# Patient Record
Sex: FEMALE | HISPANIC OR LATINO | Employment: FULL TIME | URBAN - METROPOLITAN AREA
[De-identification: names, ages, dates, MRNs, and addresses within clinical notes are randomized per-mention and may not be internally consistent; named-entity substitution may affect disease eponyms.]

---

## 2022-10-05 ENCOUNTER — HOSPITAL ENCOUNTER (OUTPATIENT)
Dept: CT IMAGING | Facility: HOSPITAL | Age: 42
Discharge: HOME | End: 2022-10-05
Payer: COMMERCIAL

## 2022-10-05 ENCOUNTER — HOSPITAL ENCOUNTER (OUTPATIENT)
Dept: GENERAL RADIOLOGY | Facility: HOSPITAL | Age: 42
Discharge: HOME | End: 2022-10-05
Payer: COMMERCIAL

## 2022-10-05 ENCOUNTER — OFFICE VISIT (OUTPATIENT)
Dept: FAMILY MEDICINE | Facility: CLINIC | Age: 42
End: 2022-10-05
Payer: COMMERCIAL

## 2022-10-05 VITALS
HEART RATE: 61 BPM | SYSTOLIC BLOOD PRESSURE: 138 MMHG | HEIGHT: 66 IN | BODY MASS INDEX: 31.4 KG/M2 | TEMPERATURE: 98.3 F | OXYGEN SATURATION: 100 % | WEIGHT: 195.4 LBS | DIASTOLIC BLOOD PRESSURE: 70 MMHG

## 2022-10-05 DIAGNOSIS — M25.511 ACUTE PAIN OF RIGHT SHOULDER: ICD-10-CM

## 2022-10-05 DIAGNOSIS — R51.9 CHRONIC NONINTRACTABLE HEADACHE, UNSPECIFIED HEADACHE TYPE: Primary | ICD-10-CM

## 2022-10-05 DIAGNOSIS — M54.2 NECK PAIN: ICD-10-CM

## 2022-10-05 DIAGNOSIS — R20.0 NUMBNESS AND TINGLING OF RIGHT ARM: ICD-10-CM

## 2022-10-05 DIAGNOSIS — R29.898 RIGHT ARM WEAKNESS: ICD-10-CM

## 2022-10-05 DIAGNOSIS — R20.2 NUMBNESS AND TINGLING OF RIGHT ARM: ICD-10-CM

## 2022-10-05 DIAGNOSIS — R29.810 DROOPING OF MOUTH: ICD-10-CM

## 2022-10-05 DIAGNOSIS — G89.29 CHRONIC NONINTRACTABLE HEADACHE, UNSPECIFIED HEADACHE TYPE: Primary | ICD-10-CM

## 2022-10-05 LAB
ALBUMIN SERPL BCG-MCNC: 4.3 G/DL (ref 3.5–5.2)
ALP SERPL-CCNC: 79 U/L (ref 35–104)
ALT SERPL W P-5'-P-CCNC: 13 U/L (ref 10–35)
ANION GAP SERPL CALCULATED.3IONS-SCNC: 9 MMOL/L (ref 7–15)
AST SERPL W P-5'-P-CCNC: 25 U/L (ref 10–35)
BILIRUB SERPL-MCNC: 0.2 MG/DL
BUN SERPL-MCNC: 14.9 MG/DL (ref 6–20)
CALCIUM SERPL-MCNC: 9.2 MG/DL (ref 8.6–10)
CHLORIDE SERPL-SCNC: 102 MMOL/L (ref 98–107)
CHOLEST SERPL-MCNC: 214 MG/DL
CREAT SERPL-MCNC: 0.78 MG/DL (ref 0.51–0.95)
DEPRECATED HCO3 PLAS-SCNC: 28 MMOL/L (ref 22–29)
ERYTHROCYTE [DISTWIDTH] IN BLOOD BY AUTOMATED COUNT: 13.5 % (ref 10–15)
GFR SERPL CREATININE-BSD FRML MDRD: >90 ML/MIN/1.73M2
GLUCOSE SERPL-MCNC: 64 MG/DL (ref 70–99)
HBA1C MFR BLD: 5.9 % (ref 0–5.6)
HCT VFR BLD AUTO: 41.4 % (ref 35–47)
HDLC SERPL-MCNC: 61 MG/DL
HGB BLD-MCNC: 13.8 G/DL (ref 11.7–15.7)
LDLC SERPL CALC-MCNC: 125 MG/DL
MCH RBC QN AUTO: 26.5 PG (ref 26.5–33)
MCHC RBC AUTO-ENTMCNC: 33.3 G/DL (ref 31.5–36.5)
MCV RBC AUTO: 80 FL (ref 78–100)
NONHDLC SERPL-MCNC: 153 MG/DL
PLATELET # BLD AUTO: 349 10E3/UL (ref 150–450)
POTASSIUM SERPL-SCNC: 3.9 MMOL/L (ref 3.4–5.3)
PROT SERPL-MCNC: 7.1 G/DL (ref 6.4–8.3)
RBC # BLD AUTO: 5.2 10E6/UL (ref 3.8–5.2)
SODIUM SERPL-SCNC: 139 MMOL/L (ref 136–145)
TRIGL SERPL-MCNC: 139 MG/DL
WBC # BLD AUTO: 8.8 10E3/UL (ref 4–11)

## 2022-10-05 PROCEDURE — 85027 COMPLETE CBC AUTOMATED: CPT

## 2022-10-05 PROCEDURE — 99204 OFFICE O/P NEW MOD 45 MIN: CPT

## 2022-10-05 PROCEDURE — 83036 HEMOGLOBIN GLYCOSYLATED A1C: CPT

## 2022-10-05 PROCEDURE — 80053 COMPREHEN METABOLIC PANEL: CPT

## 2022-10-05 PROCEDURE — 36415 COLL VENOUS BLD VENIPUNCTURE: CPT

## 2022-10-05 PROCEDURE — 73030 X-RAY EXAM OF SHOULDER: CPT | Mod: RT,FY

## 2022-10-05 PROCEDURE — 70450 CT HEAD/BRAIN W/O DYE: CPT

## 2022-10-05 PROCEDURE — 80061 LIPID PANEL: CPT

## 2022-10-05 PROCEDURE — 72040 X-RAY EXAM NECK SPINE 2-3 VW: CPT | Mod: FY

## 2022-10-05 RX ORDER — ACETAMINOPHEN 500 MG
1000 TABLET ORAL EVERY 8 HOURS PRN
Qty: 30 TABLET | Refills: 0 | Status: SHIPPED | OUTPATIENT
Start: 2022-10-05

## 2022-10-05 RX ORDER — CYCLOBENZAPRINE HCL 5 MG
5 TABLET ORAL 3 TIMES DAILY PRN
Qty: 30 TABLET | Refills: 0 | Status: SHIPPED | OUTPATIENT
Start: 2022-10-05 | End: 2022-10-12

## 2022-10-05 RX ORDER — ATORVASTATIN CALCIUM 80 MG/1
80 TABLET, FILM COATED ORAL DAILY
Qty: 30 TABLET | Refills: 0 | Status: SHIPPED | OUTPATIENT
Start: 2022-10-05 | End: 2022-10-11

## 2022-10-05 ASSESSMENT — ENCOUNTER SYMPTOMS
PALPITATIONS: 0
BACK PAIN: 1
DIZZINESS: 0
SPEECH DIFFICULTY: 0
CONFUSION: 0
HEADACHES: 1
FACIAL ASYMMETRY: 1
NECK PAIN: 1
NUMBNESS: 1
PARESTHESIAS: 1
WEAKNESS: 1
SHORTNESS OF BREATH: 0

## 2022-10-05 ASSESSMENT — PAIN SCALES - GENERAL: PAINLEVEL: EXTREME PAIN (8)

## 2022-10-05 NOTE — PROGRESS NOTES
Assessment & Plan     Chronic nonintractable headache, unspecified headache type  Drooping of mouth  Numbness and tingling of right arm  Right arm weakness  Unfortunately I do have suspicion that patient had a stroke. Symptom onset was 4 weeks ago so acute intervention is not an option.  She can get in for a head CT this evening and we will see what those results are and possibly follow-up with an MRI.  I explained this to patient with  on the phone, she verbalizes understanding.  I will do blood work-up to evaluate possible cause of why she had a stroke, we will start her on Lipitor and baby aspirin.  Discussed both medications with patient she verbalized understanding.  Blood pressure today is slightly elevated.  Patient is to follow-up with me in a week and we will reassess.  We did request medical release from her prior healthcare facility but have not received those yet.   - CT Head w/o Contrast  - atorvastatin (LIPITOR) 80 MG tablet  Dispense: 30 tablet; Refill: 0  - Comprehensive metabolic panel (BMP + Alb, Alk Phos, ALT, AST, Total. Bili, TP)  - CBC with platelets  - Hemoglobin A1c  - Lipid Profile (Chol, Trig, HDL, LDL calc)  - aspirin (ASA) 81 MG EC tablet  Dispense: 30 tablet; Refill: 0    Neck pain  She reports cervical neck pain which started when the headache, arm weakness, numbness and tingling in right arm started.  She has a slightly positive Spurling's test and her pain does radiate from her neck into her shoulder down to her lower forearm.  We will start with an x-ray of the cervical spine.  Started her on Flexeril as needed for the pain, I discussed she can take this 3 times a day as needed.  I recommended she switch to Tylenol 1000 mg every 8 hours for pain as she has been taking about 12 tablets of ibuprofen a day I educated her to no longer take ibuprofen.  - CT Head w/o Contrast  - XR Cervical Spine 2/3 Views  - cyclobenzaprine (FLEXERIL) 5 MG tablet  Dispense: 30 tablet; Refill:  "0  - acetaminophen (TYLENOL) 500 MG tablet  Dispense: 30 tablet; Refill: 0    Acute pain of right shoulder  She has limited range of motion due to pain on exam of the right shoulder specifically with extension of the shoulder.  Shoulder pain could be radiating from neck, but patient reports the shoulder pain is the most significant pain, will order x-ray.  CMS is intact to the right upper extremity.   - XR Shoulder Right 2 Views      BMI:   Estimated body mass index is 31.78 kg/m  as calculated from the following:    Height as of this encounter: 1.67 m (5' 5.75\").    Weight as of this encounter: 88.6 kg (195 lb 6.4 oz).     Return in about 1 week (around 10/12/2022), or ASAP on my schedule, for Routine preventive.    Subjective   Aura is a 41 year old, presenting for the following health issues:  Headache, Back Pain, and Arm Pain   used for visit.     Patient presents today with acute onset of headache, neck pain, and arm pain.  This started abruptly 4 weeks ago.  She has had the headache neck pain and arm pain consistently since development.  It improves when she takes 4 ibuprofen.  Which she takes 2 times a day and once at night.  The headache is there when she wakes up, and does get worse with exertional activity and coughing.  She has never had a headache like this before in the past.  The arm pain comes from her neck into her shoulder down into her forearm.  She describes it as radiating in nature.  She has associated numbness and tingling in that right arm and has noted when she picks things up she drops them because of a weaker .  She has not had any recent injury.  She does not recall any event leading up to the symptoms.     Headache   Pertinent negatives include no palpitations and no shortness of breath.   Back Pain   Associated symptoms include numbness, headaches, paresthesias and weakness. Pertinent negatives include no chest pain.   Arm Pain  Associated symptoms include " "headaches, neck pain, numbness and weakness. Pertinent negatives include no chest pain.       Review of Systems   Respiratory: Negative for shortness of breath.    Cardiovascular: Negative for chest pain and palpitations.   Musculoskeletal: Positive for back pain and neck pain. Negative for gait problem.   Neurological: Positive for facial asymmetry, weakness, numbness, headaches and paresthesias. Negative for dizziness, syncope and speech difficulty.   Psychiatric/Behavioral: Negative for confusion.     Objective    /70 (BP Location: Right arm, Patient Position: Sitting, Cuff Size: Adult Large)   Pulse 61   Temp 98.3  F (36.8  C) (Oral)   Ht 1.67 m (5' 5.75\")   Wt 88.6 kg (195 lb 6.4 oz)   LMP 10/02/2022 (Exact Date)   SpO2 100%   BMI 31.78 kg/m    Body mass index is 31.78 kg/m .     Physical Exam  Constitutional:       Appearance: She is obese.   HENT:      Mouth/Throat:      Mouth: Mucous membranes are moist.      Pharynx: Oropharynx is clear.   Eyes:      Extraocular Movements: Extraocular movements intact.      Pupils: Pupils are equal, round, and reactive to light.   Neck:      Comments: Slightly positive Spurling's test with shock feeling in her teeth and into her shoulder  Cardiovascular:      Rate and Rhythm: Normal rate and regular rhythm.      Pulses: Normal pulses.      Heart sounds: Normal heart sounds.   Pulmonary:      Effort: Pulmonary effort is normal.      Breath sounds: Normal breath sounds.   Musculoskeletal:      Right shoulder: Tenderness and bony tenderness present. No swelling, deformity, effusion or crepitus. Decreased range of motion. Normal strength. Normal pulse.      Left shoulder: Normal.      Right upper arm: No tenderness or bony tenderness.      Left upper arm: Normal.      Right elbow: Normal range of motion. No tenderness.      Left elbow: Normal.      Right hand: No tenderness. Decreased strength. Decreased sensation. Normal capillary refill. Normal pulse.      " Cervical back: Tenderness present. No rigidity.      Comments: Right hand  is decreased compared to the left. Sensation is decreased compared to the left per patient when tested simultaneously.    Lymphadenopathy:      Cervical: No cervical adenopathy.   Skin:     General: Skin is warm and dry.   Neurological:      Mental Status: She is alert.      Cranial Nerves: Facial asymmetry present.      Sensory: No sensory deficit.      Motor: Weakness present. No pronator drift.      Coordination: Coordination normal. Finger-Nose-Finger Test and Heel to Shin Test normal. Rapid alternating movements normal.      Gait: Gait normal.      Deep Tendon Reflexes:      Reflex Scores:       Patellar reflexes are 2+ on the right side and 2+ on the left side.       Achilles reflexes are 2+ on the right side and 2+ on the left side.     Comments: CN 2-12 intact with the exception of CN 5 and 7, mouth droop to the left side with twitching of the lip and cheek. All other facial movements are symmetric and equal. When tested simultaneously, patient has less sensation in ophthalmic, maxillary, and mandibular regions on the left side compared to the right.   Weakness to the right lower extremity during hand .      The 10-year ASCVD risk score ([place] DC Jr., et al., 2013) is: 0.6%    Values used to calculate the score:      Age: 41 years      Sex: Female      Is Non- : No      Diabetic: No      Tobacco smoker: No      Systolic Blood Pressure: 138 mmHg      Is BP treated: No      HDL Cholesterol: 61 mg/dL      Total Cholesterol: 214 mg/dL    Lab Results   Component Value Date    CHOL 214 10/05/2022     Lab Results   Component Value Date    HDL 61 10/05/2022     Lab Results   Component Value Date     10/05/2022     Lab Results   Component Value Date    TRIG 139 10/05/2022     At the end of the visit, I confirmed understanding of what was discussed. Patient has not further questions or concerns that were  brought up at this time.     Jean Carlos Stiles, KATHERINE, APRN, FNP-C

## 2022-10-06 DIAGNOSIS — R29.810 DROOPING OF MOUTH: Primary | ICD-10-CM

## 2022-10-06 DIAGNOSIS — R20.0 NUMBNESS AND TINGLING OF RIGHT ARM: ICD-10-CM

## 2022-10-06 DIAGNOSIS — R20.2 NUMBNESS AND TINGLING OF RIGHT ARM: ICD-10-CM

## 2022-10-06 DIAGNOSIS — R29.898 RIGHT ARM WEAKNESS: ICD-10-CM

## 2022-10-06 NOTE — PROGRESS NOTES
Patient's CT scan was without abnormal finding, will place order for MRI to rule out stroke as patient has right arm weakness, headaches, and drooping of her mouth for 4 weeks.

## 2022-10-07 ENCOUNTER — TELEPHONE (OUTPATIENT)
Dept: FAMILY MEDICINE | Facility: CLINIC | Age: 42
End: 2022-10-07

## 2022-10-10 ENCOUNTER — TELEPHONE (OUTPATIENT)
Dept: FAMILY MEDICINE | Facility: CLINIC | Age: 42
End: 2022-10-10

## 2022-10-10 DIAGNOSIS — R29.810 DROOPING OF MOUTH: ICD-10-CM

## 2022-10-10 NOTE — TELEPHONE ENCOUNTER
"Patient calling back in regards to VMs left below.  No message from provider to relay at this time.  Writer checked lab results, no notes there to relay either.  Informed patient of this, to which she relied \"that is okay, I will see her on Wednesday and get the results at that time.\"  Call was ended.  "

## 2022-10-10 NOTE — TELEPHONE ENCOUNTER
FAX [facility] 90 Day Prescription Request    Message:  Patient is requesting authorization to dispense a 90 day supply    Drug: ATORVASTATIN 80 MG Tablets

## 2022-10-11 ENCOUNTER — TELEPHONE (OUTPATIENT)
Dept: FAMILY MEDICINE | Facility: CLINIC | Age: 42
End: 2022-10-11

## 2022-10-11 RX ORDER — ATORVASTATIN CALCIUM 80 MG/1
80 TABLET, FILM COATED ORAL DAILY
Qty: 90 TABLET | Refills: 0 | Status: SHIPPED | OUTPATIENT
Start: 2022-10-11 | End: 2022-11-10

## 2022-10-12 ENCOUNTER — OFFICE VISIT (OUTPATIENT)
Dept: FAMILY MEDICINE | Facility: CLINIC | Age: 42
End: 2022-10-12
Payer: COMMERCIAL

## 2022-10-12 VITALS
BODY MASS INDEX: 32.29 KG/M2 | WEIGHT: 193.8 LBS | DIASTOLIC BLOOD PRESSURE: 74 MMHG | HEART RATE: 68 BPM | HEIGHT: 65 IN | SYSTOLIC BLOOD PRESSURE: 128 MMHG

## 2022-10-12 DIAGNOSIS — R29.898 RIGHT ARM WEAKNESS: ICD-10-CM

## 2022-10-12 DIAGNOSIS — R20.0 NUMBNESS AND TINGLING OF RIGHT ARM: ICD-10-CM

## 2022-10-12 DIAGNOSIS — R29.810 DROOPING OF MOUTH: Primary | ICD-10-CM

## 2022-10-12 DIAGNOSIS — R73.03 PREDIABETES: ICD-10-CM

## 2022-10-12 DIAGNOSIS — R20.2 NUMBNESS AND TINGLING OF RIGHT ARM: ICD-10-CM

## 2022-10-12 LAB
TSH SERPL DL<=0.005 MIU/L-ACNC: 1.7 UIU/ML (ref 0.3–4.2)
VIT B12 SERPL-MCNC: 711 PG/ML (ref 232–1245)

## 2022-10-12 PROCEDURE — 84443 ASSAY THYROID STIM HORMONE: CPT

## 2022-10-12 PROCEDURE — 36415 COLL VENOUS BLD VENIPUNCTURE: CPT

## 2022-10-12 PROCEDURE — 99214 OFFICE O/P EST MOD 30 MIN: CPT

## 2022-10-12 PROCEDURE — 82607 VITAMIN B-12: CPT

## 2022-10-12 PROCEDURE — 86618 LYME DISEASE ANTIBODY: CPT

## 2022-10-12 RX ORDER — METFORMIN HCL 500 MG
1000 TABLET, EXTENDED RELEASE 24 HR ORAL 2 TIMES DAILY WITH MEALS
Qty: 90 TABLET | Refills: 3 | Status: SHIPPED | OUTPATIENT
Start: 2022-10-12

## 2022-10-12 RX ORDER — PREDNISONE 20 MG/1
40 TABLET ORAL DAILY
Qty: 10 TABLET | Refills: 0 | Status: SHIPPED | OUTPATIENT
Start: 2022-10-12 | End: 2022-10-17

## 2022-10-12 ASSESSMENT — ENCOUNTER SYMPTOMS
EYE PAIN: 0
SPEECH DIFFICULTY: 0
FEVER: 0
HEADACHES: 1
SHORTNESS OF BREATH: 0
NUMBNESS: 1
NECK STIFFNESS: 1
WEAKNESS: 1
BACK PAIN: 0
FACIAL ASYMMETRY: 1
NECK PAIN: 1
CHEST TIGHTNESS: 0
PHOTOPHOBIA: 0
PARESTHESIAS: 1
DIAPHORESIS: 0
DIZZINESS: 1
FATIGUE: 0
PALPITATIONS: 0
SEIZURES: 0

## 2022-10-12 NOTE — PROGRESS NOTES
Assessment & Plan     Drooping of mouth  Numbness and tingling of right arm  Right arm weakness  On exam her handgrips are equal and arm strength are equal.  She is still reporting pain but improvement when she uses Flexeril.  I explained that I do think physical therapy would be beneficial and ordered this.  I did encourage patient to schedule the MRI I ordered to see if we can find any cause to the symptoms.  I added on an MRA of the [place] to the order as well. The drooping of her mouth does seem to be progressing with no other neuro changes. The droop is more prominent with smiling than on previous exam. I will do a 5 day course of prednisone to see if this provides benefit. On exam there is no swelling of the left eye today. Unsure the etiology of this, but patient reports it was not painful, and there was no vision changes with the swelling.  We will wait and see what the MRI/MRA and blood work results look like.   - TSH with free T4 reflex  - Lyme Disease Total Abs Bld with Reflex to Confirm CLIA  - Vitamin B12  - Physical Therapy Referral  - ESR  - CRP    Prediabetes  Patient's A1c is 5.9.  She notes every person in her family has diabetes except for her. I discussed that if she wanted we could start metformin.  I explained side effects and dosing instructions.  Her BMI is 32.  Based on this, her family history, and her reporting not a lot of exercise in her daily life, I do think treating the prediabetes would benefit her in the long-run. She reports she would rather start the medication now and try and prevent type II diabetes from developing.  We discussed signs and symptoms of both hypoglycemia and hyperglycemia. I will see her again in three months to see how things are going.   - metFORMIN (GLUCOPHAGE XR) 500 MG 24 hr tablet; Take 2 tablets (1,000 mg) by mouth 2 times daily (with meals) Week 1: Take 1 tablet with breakfast for a week. Week 2: Take 1 tablet with breakfast and 1 tablet with  "dinner for a week Week 3: Take 2 tablets with breakfast and 1 tablet with dinner for a week Week 4: Take 2 tablets with breakfast and 2 tablets with dinner for a week. Continue at this dose.  Dispense: 90 tablet; Refill: 3      Return in about 3 months (around 1/12/2023) for Follow up. To see how she is doing after starting metformin    Jai Stiles, APRN CNP  [facility]    Subjective   Aura is a 41 year old, presenting for the following health issues:  Follow up after visit for arm pain, weakness, and facial droop.     HPI     Her right arm is still hurting form her right shoulder down into her hand. Her hand is numb and tingling still and she feels like it is still weak. The flexeril has been helping with the pain. She still notices the left side of her face drooping. She also had right sided eye swelling yesterday.  She has not noticed any other neurological changes since her last visit.     Review of Systems   Constitutional: Negative for diaphoresis, fatigue and fever.   Eyes: Negative for photophobia, pain and visual disturbance.        Swelling to the left eye yesterday   Respiratory: Negative for chest tightness and shortness of breath.    Cardiovascular: Negative for chest pain, palpitations and peripheral edema.   Musculoskeletal: Positive for neck pain and neck stiffness. Negative for back pain and gait problem.   Neurological: Positive for dizziness, facial asymmetry, weakness, numbness, headaches and paresthesias. Negative for seizures, syncope and speech difficulty.         Objective    /74 (BP Location: Right arm, Patient Position: Sitting, Cuff Size: Adult Regular)   Pulse 68   Ht 1.651 m (5' 5\")   Wt 87.9 kg (193 lb 12.8 oz)   LMP 10/02/2022 (Exact Date)   BMI 32.25 kg/m    Body mass index is 32.25 kg/m .  Physical Exam  HENT:      Right Ear: Tympanic membrane, ear canal and external ear normal.      Left Ear: Tympanic membrane, ear canal and external ear " normal.   Eyes:      Extraocular Movements: Extraocular movements intact.      Pupils: Pupils are equal, round, and reactive to light.   Cardiovascular:      Rate and Rhythm: Normal rate and regular rhythm.      Pulses: Normal pulses.      Heart sounds: Normal heart sounds.   Pulmonary:      Effort: Pulmonary effort is normal.      Breath sounds: Normal breath sounds. No wheezing.   Musculoskeletal:      Cervical back: Normal range of motion. No rigidity.   Lymphadenopathy:      Cervical: No cervical adenopathy.   Neurological:      Mental Status: She is alert.      Cranial Nerves: Facial asymmetry present.      Motor: No weakness, tremor, abnormal muscle tone or pronator drift.      Coordination: Romberg sign negative.      Gait: Gait normal.      Comments: Drooping of mouth with smile, drooping to the left. Appears to be increase in the amount of drooping when compared to last week.         At the end of the visit, I confirmed understanding of what was discussed. Patient has not further questions or concerns that were brought up at this time.     Jean Carlos Stiles, KATHERINE, APRN, FNP-C

## 2022-10-13 LAB — B BURGDOR IGG+IGM SER QL: 0.51

## 2022-10-14 ENCOUNTER — TELEPHONE (OUTPATIENT)
Dept: FAMILY MEDICINE | Facility: CLINIC | Age: 42
End: 2022-10-14

## 2022-10-14 NOTE — TELEPHONE ENCOUNTER
Called patient via  services and left a generic message for her to return phone call. If patient returns call please inform her of the results/message below.

## 2022-10-14 NOTE — TELEPHONE ENCOUNTER
----- Message from ZANDRA Alicea CNP sent at 10/13/2022 10:34 AM CDT -----  Please call patient with :    Lyme disease test was negative, thyroid is functioning normally, and her B12 was normal on blood work yesterday. She will need to have the MRI imaging completed for us to try and determine if symptoms are from a stroke or not. Continue with the management plan we talked about yesterday, and I will watch for the MRI results and be in contact with her.     Jean Carlos Stiles

## 2022-11-02 ENCOUNTER — HOSPITAL ENCOUNTER (OUTPATIENT)
Dept: MRI IMAGING | Facility: HOSPITAL | Age: 42
Discharge: HOME | End: 2022-11-02
Payer: COMMERCIAL

## 2022-11-02 DIAGNOSIS — R20.0 NUMBNESS AND TINGLING OF RIGHT ARM: ICD-10-CM

## 2022-11-02 DIAGNOSIS — R20.2 NUMBNESS AND TINGLING OF RIGHT ARM: ICD-10-CM

## 2022-11-02 DIAGNOSIS — R29.898 RIGHT ARM WEAKNESS: ICD-10-CM

## 2022-11-02 DIAGNOSIS — R29.810 DROOPING OF MOUTH: ICD-10-CM

## 2022-11-02 PROCEDURE — 70544 MR ANGIOGRAPHY HEAD W/O DYE: CPT

## 2022-11-02 PROCEDURE — 70551 MRI BRAIN STEM W/O DYE: CPT

## 2022-11-03 ENCOUNTER — LAB (OUTPATIENT)
Dept: LAB | Facility: CLINIC | Age: 42
End: 2022-11-03
Payer: COMMERCIAL

## 2022-11-03 DIAGNOSIS — R29.810 DROOPING OF MOUTH: ICD-10-CM

## 2022-11-03 LAB
CRP SERPL-MCNC: <3 MG/L
ERYTHROCYTE [SEDIMENTATION RATE] IN BLOOD BY WESTERGREN METHOD: 11 MM/HR (ref 0–20)

## 2022-11-03 PROCEDURE — 86140 C-REACTIVE PROTEIN: CPT

## 2022-11-03 PROCEDURE — 36415 COLL VENOUS BLD VENIPUNCTURE: CPT

## 2022-11-03 PROCEDURE — 85652 RBC SED RATE AUTOMATED: CPT

## 2022-11-04 ENCOUNTER — TELEPHONE (OUTPATIENT)
Dept: FAMILY MEDICINE | Facility: CLINIC | Age: 42
End: 2022-11-04

## 2022-11-04 DIAGNOSIS — R20.0 NUMBNESS AND TINGLING OF RIGHT ARM: ICD-10-CM

## 2022-11-04 DIAGNOSIS — R93.89 ABNORMAL MRI: Primary | ICD-10-CM

## 2022-11-04 DIAGNOSIS — R29.810 DROOPING OF MOUTH: ICD-10-CM

## 2022-11-04 DIAGNOSIS — R29.898 RIGHT ARM WEAKNESS: ICD-10-CM

## 2022-11-04 DIAGNOSIS — R20.2 NUMBNESS AND TINGLING OF RIGHT ARM: ICD-10-CM

## 2022-11-04 NOTE — TELEPHONE ENCOUNTER
I called patient with  to go over imaging results. I explained that while the MRA did not explain her symptoms, but the MRI showed hyperintense signal abnormalities that could be due to age or due to a demyelination disease like multiple sclerosis. I did talk about how since she is having the onset of facial droop, arm weakness, and arm tingling/numbnes I think seeing a neurologist would be valuable for further work-up. She agrees. Referral was placed.

## 2022-11-08 DIAGNOSIS — R29.810 DROOPING OF MOUTH: ICD-10-CM

## 2022-11-10 RX ORDER — ATORVASTATIN CALCIUM 80 MG/1
80 TABLET, FILM COATED ORAL DAILY
Qty: 90 TABLET | Refills: 3 | Status: SHIPPED | OUTPATIENT
Start: 2022-11-10

## 2022-11-10 NOTE — TELEPHONE ENCOUNTER
"Routing refill request to provider for review/approval because:  Early refill requested.    Last Written Prescription Date:  10/11/22  Last Fill Quantity: 90,  # refills: 0   Last office visit provider:  10/12/22     Requested Prescriptions   Pending Prescriptions Disp Refills     atorvastatin (LIPITOR) 80 MG tablet 90 tablet 0     Sig: Take 1 tablet (80 mg) by mouth daily       Statins Protocol Passed - 11/10/2022 12:54 PM        Passed - LDL on file in past 12 months     Recent Labs   Lab Test 10/05/22  1625   *             Passed - No abnormal creatine kinase in past 12 months     No lab results found.             Passed - Recent (12 mo) or future (30 days) visit within the authorizing provider's specialty     Patient has had an office visit with the authorizing provider or a provider within the authorizing providers department within the previous 12 mos or has a future within next 30 days. See \"Patient Info\" tab in inbasket, or \"Choose Columns\" in Meds & Orders section of the refill encounter.              Passed - Medication is active on med list        Passed - Patient is age 18 or older        Passed - No active pregnancy on record        Passed - No positive pregnancy test in past 12 months             Jimmy Noriega RN 11/10/22 12:54 PM  "

## 2022-11-14 ENCOUNTER — OFFICE VISIT (OUTPATIENT)
Dept: NEUROLOGY | Facility: CLINIC | Age: 42
End: 2022-11-14
Payer: COMMERCIAL

## 2022-11-14 VITALS — HEART RATE: 77 BPM | DIASTOLIC BLOOD PRESSURE: 96 MMHG | SYSTOLIC BLOOD PRESSURE: 142 MMHG

## 2022-11-14 DIAGNOSIS — R29.898 RIGHT LEG WEAKNESS: Primary | ICD-10-CM

## 2022-11-14 DIAGNOSIS — R29.898 RIGHT ARM WEAKNESS: ICD-10-CM

## 2022-11-14 DIAGNOSIS — R20.0 NUMBNESS AND TINGLING OF RIGHT ARM: ICD-10-CM

## 2022-11-14 DIAGNOSIS — G43.509 PERSISTENT MIGRAINE AURA WITHOUT CEREBRAL INFARCTION AND WITHOUT STATUS MIGRAINOSUS, NOT INTRACTABLE: ICD-10-CM

## 2022-11-14 DIAGNOSIS — R20.2 NUMBNESS AND TINGLING OF RIGHT ARM: ICD-10-CM

## 2022-11-14 DIAGNOSIS — M50.20 CERVICAL HERNIATED DISC: ICD-10-CM

## 2022-11-14 PROCEDURE — 99204 OFFICE O/P NEW MOD 45 MIN: CPT | Performed by: PSYCHIATRY & NEUROLOGY

## 2022-11-14 NOTE — PATIENT INSTRUCTIONS
We found some weakness on your right side.    We are concerned that there is a problem in your neck. This may have bene caused by the injury you had. This could have caused a disk in the neck to shift and could be irritating the nerves causing your headache. MRI will help us know for sure.     We would also recommend physical therapy for the neck.     Follow up 8-10 weeks

## 2022-11-14 NOTE — PROGRESS NOTES
Neurology Clinic Visit    Reason: Right arm tingling and facial droop       11/14/2022   Source of information: Patient and chart review    History of Present Symptom:  Nataliia Chin is a 41 year old female with a PMH significant for prediabetes who presents for evaluation of right arm tingling and facial droop after MRI showed some T2 white matter changes.      She reported she had a unilateral headache and pain in right side. She gets tingling in the right hand associated with headaches. She has dropped things like cups of coffee with her hand. She also feels her right ankle becomes weak. Sometimes when she gets headache she needs to lay down because she may fall. Her pain gets worse when she flexes her neck, and she does sometimes get a sensation down her spine. These first started after she hit her head on a metal jethro at work one year ago.     Headaches are happening about once or twice a day. She started ibuprofen after a visit with primary care and that has been helpful. They are up to 9/10 at the worst and they resolved with medication. She is taking ibuprofen 600 about 2-3 times per week. Worse with birght light or loud sounds.      No prior history of vision loss in one eye, double vision, or balance difficulty. No previous episodes of weakness or numbness that resolved. No bowel or bladder changes.     The patient's medical, surgical, social, and family history were personally reviewed with the patient.  No past medical history on file.   Past Surgical History:   Procedure Laterality Date     LAPAROSCOPIC TUBAL LIGATION       Social History     Tobacco Use     Smoking status: Never     Smokeless tobacco: Never     No family history on file.  Current Outpatient Medications   Medication     acetaminophen (TYLENOL) 500 MG tablet     aspirin (ASA) 81 MG EC tablet     atorvastatin (LIPITOR) 80 MG tablet     cyclobenzaprine (FLEXERIL) 10 MG tablet     metFORMIN (GLUCOPHAGE XR) 500 MG 24 hr tablet      No current facility-administered medications for this visit.     Allergies   Allergen Reactions     Penicillins Anaphylaxis and Itching     At age 15 years old--injection of penicillin     Amoxicillin Itching         Review of Systems:  14-point review of systems was completed. The pertinent positives and negatives are in the HPI.    Physical Examination   Vitals: BP (!) 142/96 (BP Location: Right arm, Patient Position: Sitting, Cuff Size: Adult Regular)   Pulse 77   LMP 10/02/2022 (Exact Date)   General: Patient appears comfortable in no acute distress.   HEENT: NC/AT, no icterus, moist mucous membranes  Chest: non-labored on RA  Extremities: Warm, no edema  Skin: No rash or lesion   Psych: Affect appropriate for situation   Neuro:  Mental status: Awake, alert, attentive. Language is fluent with intact comprehension of commands.  Cranial nerves: PERRL with no relative afferent pupillary defect, conjugate gaze, EOMI, visual fields intact, face symmetric, shoulder shrug strong, tongue protrusion/uvula midline, no dysarthria.   Motor: Normal muscle bulk and tone. No abnormal movements. 5/5 strength in 4/4 extremities.     R L  Deltoid  5 5  Biceps  5 5  Triceps 5 5  Wrist ext 5- 5  Finger abd 5- 5-    Hip flexion 4 5  Knee flexion 5 5  Knee ext 5 5  Dorsiflexion 4 5    Reflexes: brisk reflexes symmetric in biceps, brachioradialis, 3+ bilateral patellae with crossed adduction, and brisk achilles on the right. No clonus.  Sensory: Intact to light touch, pin, vibration, and proprioception. Romberg is negative.   Coordination: FNF without ataxia or dysmetria.    Gait: Normal width, stride length, turn, with symmetric arm swing. Tandem walk intact.    Laboratory:  B12 711  Lyme 0.51   CRP neg   A1C 5.9     Imaging:  MRI brain 11/2/22 a few very nonspecific white matter lesions   MRA IMPRESSION:  1.  Normal variant anatomy at the level [place]. No evidence for aneurysm or large vessel occlusion. Tortuosity  cavernous ICA segment on the left. See above for details and description. Symmetric peripheral branch arborization UVALDO, MCA and PCA   vascular territory.    Assessment/Plan:  Nataliia Chin is a 41 year old female who presents for evaluation of right sided headaches and weakness. Her exam is with right sided weakness in the hand and leg with some limitation due to pain and brisk reflexes with cross at the adductors. Her symptoms started after head injury. Her exam and symptoms are consistent with a cervical cord lesion, it is possible that at this injury she herniated a disc causing some compression in the cord. The timing makes us suspicious for this. There are other possible causes as well.     -MRI c-spine   -physcial therapy for the neck, this should help with headaches too  -follow up 8-10 weeks     Patient seen and discussed with Dr. Soto.   I have reviewed the plan with the patient, who is in agreement.      Krista Fernandez DO  Multiple Sclerosis Fellow

## 2022-11-14 NOTE — LETTER
11/14/2022         RE: Nataliia Chin  [address] [place] [address]        Dear Colleague,    Thank you for referring your patient, Nataliia Chin, to the [facility]. Please see a copy of my visit note below.      Neurology Clinic Visit    Reason: Right arm tingling and facial droop       11/14/2022   Source of information: Patient and chart review    History of Present Symptom:  Nataliia Chin is a 41 year old female with a PMH significant for prediabetes who presents for evaluation of right arm tingling and facial droop after MRI showed some T2 white matter changes.      She reported she had a unilateral headache and pain in right side. She gets tingling in the right hand associated with headaches. She has dropped things like cups of coffee with her hand. She also feels her right ankle becomes weak. Sometimes when she gets headache she needs to lay down because she may fall. Her pain gets worse when she flexes her neck, and she does sometimes get a sensation down her spine. These first started after she hit her head on a metal jethro at work one year ago.     Headaches are happening about once or twice a day. She started ibuprofen after a visit with primary care and that has been helpful. They are up to 9/10 at the worst and they resolved with medication. She is taking ibuprofen 600 about 2-3 times per week. Worse with birght light or loud sounds.      No prior history of vision loss in one eye, double vision, or balance difficulty. No previous episodes of weakness or numbness that resolved. No bowel or bladder changes.     The patient's medical, surgical, social, and family history were personally reviewed with the patient.  No past medical history on file.   Past Surgical History:   Procedure Laterality Date     LAPAROSCOPIC TUBAL LIGATION       Social History     Tobacco Use     Smoking status: Never     Smokeless tobacco: Never     No family  history on file.  Current Outpatient Medications   Medication     acetaminophen (TYLENOL) 500 MG tablet     aspirin (ASA) 81 MG EC tablet     atorvastatin (LIPITOR) 80 MG tablet     cyclobenzaprine (FLEXERIL) 10 MG tablet     metFORMIN (GLUCOPHAGE XR) 500 MG 24 hr tablet     No current facility-administered medications for this visit.     Allergies   Allergen Reactions     Penicillins Anaphylaxis and Itching     At age 15 years old--injection of penicillin     Amoxicillin Itching         Review of Systems:  14-point review of systems was completed. The pertinent positives and negatives are in the HPI.    Physical Examination   Vitals: BP (!) 142/96 (BP Location: Right arm, Patient Position: Sitting, Cuff Size: Adult Regular)   Pulse 77   LMP 10/02/2022 (Exact Date)   General: Patient appears comfortable in no acute distress.   HEENT: NC/AT, no icterus, moist mucous membranes  Chest: non-labored on RA  Extremities: Warm, no edema  Skin: No rash or lesion   Psych: Affect appropriate for situation   Neuro:  Mental status: Awake, alert, attentive. Language is fluent with intact comprehension of commands.  Cranial nerves: PERRL with no relative afferent pupillary defect, conjugate gaze, EOMI, visual fields intact, face symmetric, shoulder shrug strong, tongue protrusion/uvula midline, no dysarthria.   Motor: Normal muscle bulk and tone. No abnormal movements. 5/5 strength in 4/4 extremities.     R L  Deltoid  5 5  Biceps  5 5  Triceps 5 5  Wrist ext 5- 5  Finger abd 5- 5-    Hip flexion 4 5  Knee flexion 5 5  Knee ext 5 5  Dorsiflexion 4 5    Reflexes: brisk reflexes symmetric in biceps, brachioradialis, 3+ bilateral patellae with crossed adduction, and brisk achilles on the right. No clonus.  Sensory: Intact to light touch, pin, vibration, and proprioception. Romberg is negative.   Coordination: FNF without ataxia or dysmetria.    Gait: Normal width, stride length, turn, with symmetric arm swing. Tandem walk  intact.    Laboratory:  B12 711  [place] 0.51   CRP neg   A1C 5.9     Imaging:  MRI brain 11/2/22 a few very nonspecific white matter lesions   MRA IMPRESSION:  1.  Normal variant anatomy at the level [place] of [place]. No evidence for aneurysm or large vessel occlusion. Tortuosity cavernous ICA segment on the left. See above for details and description. Symmetric peripheral branch arborization UVALDO, MCA and PCA   vascular territory.    Assessment/Plan:  Nataliia Chin is a 41 year old female who presents for evaluation of right sided headaches and weakness. Her exam is with right sided weakness in the hand and leg with some limitation due to pain and brisk reflexes with cross at the adductors. Her symptoms started after head injury. Her exam and symptoms are consistent with a cervical cord lesion, it is possible that at this injury she herniated a disc causing some compression in the cord. The timing makes us suspicious for this. There are other possible causes as well.     -MRI c-spine   -physcial therapy for the neck, this should help with headaches too  -follow up 8-10 weeks     Patient seen and discussed with Dr. Soto.   I have reviewed the plan with the patient, who is in agreement.      Krista Fernandez DO  Multiple Sclerosis Fellow             Attestation signed by Deb Soto MD at 11/19/2022  8:50 AM:  I personally saw and evaluated Ms. Chacho Chin with Dr. Fernandez on the date of service.  I have reviewed the above documentation and agree with the findings and recommendations.     Persistent right sided weakness and pain. Endorses radicular symptoms in right c6 dist. Recommend imaging of cervical spine     Demyelinating disease not suspected based on MRI brain.     Advised physical therapy     Patient to follow up in 8-10 weeks     Entire visit was completed with the assistance of a professional medical  who was present through the ipad.     A total of 50 minutes were  personally spent in the care of this patient on the date of service.     Deb Soto MD on 11/19/2022 at 8:47 AM        Again, thank you for allowing me to participate in the care of your patient.        Sincerely,        Deb Soto MD

## 2022-11-17 ENCOUNTER — HOSPITAL ENCOUNTER (OUTPATIENT)
Dept: PHYSICAL THERAPY | Facility: REHABILITATION | Age: 42
Discharge: HOME | End: 2022-11-17
Attending: COUNSELOR
Payer: COMMERCIAL

## 2022-11-17 DIAGNOSIS — M50.20 CERVICAL HERNIATED DISC: ICD-10-CM

## 2022-11-17 DIAGNOSIS — R20.2 NUMBNESS AND TINGLING OF RIGHT ARM: ICD-10-CM

## 2022-11-17 DIAGNOSIS — R20.0 NUMBNESS AND TINGLING OF RIGHT ARM: ICD-10-CM

## 2022-11-17 DIAGNOSIS — R29.898 RIGHT ARM WEAKNESS: ICD-10-CM

## 2022-11-17 PROCEDURE — 97140 MANUAL THERAPY 1/> REGIONS: CPT | Mod: GP | Performed by: PHYSICAL THERAPIST

## 2022-11-17 PROCEDURE — 97110 THERAPEUTIC EXERCISES: CPT | Mod: GP | Performed by: PHYSICAL THERAPIST

## 2022-11-17 PROCEDURE — 97162 PT EVAL MOD COMPLEX 30 MIN: CPT | Mod: GP | Performed by: PHYSICAL THERAPIST

## 2022-11-17 NOTE — PROGRESS NOTES
Subjective: She has been getting headaches and neck pain for around one year without any significant relief of her symptoms. This started after she hit her head on a metal jethro at work. She notes some decrease strength in her hand, causing her to drop coffee cups and other things.    Assessment: Nataliia presents to therapy today with c/o three month history of cervical pain with radiculopathy. She reported complete centralization of her symptoms with manual therapy today, noting the most relief with side glides. Her pain did come back when she stood up, but only to her shoulder. Her quick response to manual techniques indicates likely functional instability in her cervical spine which may lead to recurrence of her symptoms. She was educated on this and expressed her understanding. Nataliia will benefit from skilled therapy services to promote centralization and abolition of her symptoms and increase her cervical postural strength.    Therapeutic Exercise    Date 11/17/22   Exercise    Supine chin tuck x10   Seated scap squeezes x10

## 2022-11-17 NOTE — PROGRESS NOTES
11/17/22 1600   General Information   Type of Visit Initial OP Ortho PT Evaluation   Start of Care Date 11/17/22   Referring Physician Krista Fernandez MD   Orders Evaluate and Treat   Date of Order 11/14/22   Certification Required? No   Medical Diagnosis Cervical herniated disc   Surgical/Medical history reviewed Yes   Precautions/Limitations no known precautions/limitations       Present Yes   Language Lithuanian   Body Part(s)   Body Part(s) Cervical Spine   Presentation and Etiology   Pertinent history of current problem (include personal factors and/or comorbidities that impact the POC) See note   Impairments A. Pain;D. Decreased ROM;E. Decreased flexibility;K. Numbness;L. Tingling   Functional Limitations perform activities of daily living;perform desired leisure / sports activities;perform required work activities   Symptom Location Right side of neck, right arm all the way down to hand.   How/Where did it occur At work   Onset date of current episode/exacerbation 08/14/22   Chronicity Chronic   Pain rating (0-10 point scale) Best (/10);Worst (/10)   Best (/10) 8   Worst (/10) 10   Pain quality C. Aching;D. Burning   Frequency of pain/symptoms A. Constant   Pain/symptoms are: Worse during the day   Pain/symptoms exacerbated by I. Bending;J. ADL;K. Home tasks;L. Work tasks;D. Carrying;C. Lifting;G. Certain positions;H. Overhead reach   Pain/symptoms eased by I. OTC medication(s)   Progression of symptoms since onset: Worsened   Fall Risk Screen   Fall screen completed by PT   Have you fallen 2 or more times in the past year? No   Have you fallen and had an injury in the past year? No   Is patient a fall risk? No   Abuse Screen (yes response referral indicated)   Feels Unsafe at Home or Work/School no   Feels Threatened by Someone no   Does Anyone Try to Keep You From Having Contact with Others or Doing Things Outside Your Home? no   Physical Signs of Abuse Present no   Patient needs  abuse support services and resources No   Cervical Spine   Cervical Left Side Bending ROM 30   Cervical Right Rotation ROM 90   Cervical Left Rotation ROM 75   Cervical Flexion ROM 60   Cervical Extension ROM 30   Cervical Right Side Bending ROM 30   Shoulder AROM Screen Limited and painful in flexion and abduction, ~150 degrees each way   Shoulder Shrug (C2-C4) Strength 4   Shoulder Abd (C5) Strength 3   Shoulder Add (C7) Strength 3   Shoulder ER (C5, C6) Strength 4   Shoulder IR (C5, C6) Strength 4   Elbow Flexion (C5, C6) Strength 4   Elbow Extension (C7) Strength 4   Wrist Extension (C6) Strength 4   Wrist Flexion (C7) Strength 4   Shoulder/Wrist/Hand Strength Comments Strength seems to me limited by pain   Upper Trapezius Flexibility WNL   Levator Scapula Flexibility WNL   Scalene Flexibility WNL   Pectoralis Minor Flexibility WNL   Vertebral Artery Test -   Alar Ligament Test -   Transverse Ligament Test -   Spurling Test +   Cervical Distraction Test +   Neck Flexor Endurance Test (normal 39 sec) Limited   Palpation Pain over right paraspinals, scalenes, SCM, and UT.   Posture Forward head and shoulders, increased thoracic kyphosis   Planned Therapy Interventions   Planned Therapy Interventions manual therapy;neuromuscular re-education;joint mobilization;ROM;strengthening;stretching   Planned Modality Interventions   Planned Modality Interventions Traction;Ultrasound;TENS   Clinical Impression   Criteria for Skilled Therapeutic Interventions Met yes, treatment indicated   PT Diagnosis Decreased activity tolerance, difficulty completing ADLs, impaired posture, disturbed sleep   Influenced by the following impairments Pain, numbness, tingling   Functional limitations due to impairments Pain/difficulty brushing her hair.   Clinical Presentation Evolving/Changing   Clinical Decision Making (Complexity) Moderate complexity   Therapy Frequency 1 time/week   Predicted Duration of Therapy Intervention (days/wks) 12  weeks   Risk & Benefits of therapy have been explained Yes   Patient, Family & other staff in agreement with plan of care Yes   ORTHO GOALS   PT Ortho Eval Goals 1;2;3;4   Ortho Goal 1   Goal Identifier Cervical Extension ROM   Goal Description Nataliia will increase cervical extension ROM to >/= 40 degrees without pain in order to improve the quality of their ADLs.   Goal Progress 20 degrees   Target Date 02/15/23   Ortho Goal 2   Goal Identifier NDI   Goal Description Nataliia will demonstrate a decrease in pain and increase in function as measured by scoring </= 10/50 on the NDI.   Goal Progress 24/50   Target Date 02/15/23   Ortho Goal 3   Goal Identifier Radicular symptoms   Goal Description Nataliia will demonstrate complete centralization for at least four days per week in order to return her to her PLOF.   Goal Progress To fingers, constant   Target Date 02/15/23   Ortho Goal 4   Goal Identifier Hair combing   Goal Description Nataliia will be able to comb her hair with </= 3/10 pain to improve her QOL.   Goal Progress Unable, 9/10 pain   Target Date 02/15/23   Total Evaluation Time   PT Eval, Moderate Complexity Minutes (69598) 13

## 2022-12-23 ENCOUNTER — TELEPHONE (OUTPATIENT)
Dept: PHYSICAL THERAPY | Facility: REHABILITATION | Age: 42
End: 2022-12-23

## 2022-12-23 NOTE — TELEPHONE ENCOUNTER
Nataliia missed their scheduled appointment today at 3:15 PM. I called and spoke to them, informing them of this and reminding them of their next appointment on 12/30/22.

## 2022-12-30 ENCOUNTER — TELEPHONE (OUTPATIENT)
Dept: PHYSICAL THERAPY | Facility: REHABILITATION | Age: 42
End: 2022-12-30

## 2022-12-30 NOTE — ADDENDUM NOTE
Encounter addended by: Charles Magdaleno, PT on: 12/30/2022 3:48 PM   Actions taken: Episode resolved, Clinical Note Signed

## 2022-12-30 NOTE — PROGRESS NOTES
[facility]    Outpatient Physical Therapy Discharge Note  Patient: Nataliia Chin  : 1980    Beginning/End Dates of Reporting Period:  22 to 22    Referring Provider: Krista Fernandez MD    Therapy Diagnosis: Decreased activity tolerance, difficulty completing ADLs, impaired posture, disturbed sleep     Client Self Report: See note    Objective Measurements:  Objective Measure: NDI  Details:      Goals:  Goal Identifier Cervical Extension ROM   Goal Description Nataliia will increase cervical extension ROM to >/= 40 degrees without pain in order to improve the quality of their ADLs.   Target Date 02/15/23   Date Met      Progress (detail required for progress note): 20 degrees     Goal Identifier NDI   Goal Description Nataliia will demonstrate a decrease in pain and increase in function as measured by scoring </= 10/50 on the NDI.   Target Date 02/15/23   Date Met      Progress (detail required for progress note):      Goal Identifier Radicular symptoms   Goal Description Nataliia will demonstrate complete centralization for at least four days per week in order to return her to her PLOF.   Target Date 02/15/23   Date Met      Progress (detail required for progress note): To fingers, constant     Goal Identifier Hair combing   Goal Description Nataliia will be able to comb her hair with </= 3/10 pain to improve her QOL.   Target Date 02/15/23   Date Met      Progress (detail required for progress note): Unable, 9/10 pain     Plan:  Discharge from therapy.    Discharge:    Reason for Discharge: Patient has no showed two visits in a row.    Discharge Plan: Patient to continue home program.

## 2022-12-30 NOTE — TELEPHONE ENCOUNTER
Nataliia missed their scheduled appointment today at 3:15 pm. I called them to inform them of this and let them know that, in accordance with the clinic's no-show policy, they will be discharged, however there was no answer and I was unable to leave a voicemail.

## 2023-01-29 ENCOUNTER — APPOINTMENT (OUTPATIENT)
Dept: RADIOLOGY | Facility: HOSPITAL | Age: 43
End: 2023-01-29
Payer: COMMERCIAL

## 2023-01-29 ENCOUNTER — HOSPITAL ENCOUNTER (EMERGENCY)
Facility: HOSPITAL | Age: 43
Discharge: HOME | End: 2023-01-29
Payer: COMMERCIAL

## 2023-01-29 ENCOUNTER — APPOINTMENT (OUTPATIENT)
Dept: CT IMAGING | Facility: HOSPITAL | Age: 43
End: 2023-01-29
Payer: COMMERCIAL

## 2023-01-29 VITALS
HEIGHT: 65 IN | BODY MASS INDEX: 32.15 KG/M2 | RESPIRATION RATE: 16 BRPM | WEIGHT: 193 LBS | DIASTOLIC BLOOD PRESSURE: 82 MMHG | HEART RATE: 64 BPM | OXYGEN SATURATION: 100 % | TEMPERATURE: 98.2 F | SYSTOLIC BLOOD PRESSURE: 133 MMHG

## 2023-01-29 DIAGNOSIS — S32.2XXA FRACTURE OF COCCYX (H): ICD-10-CM
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
## 2023-01-29 DIAGNOSIS — M54.50 LOW BACK PAIN: ICD-10-CM

## 2023-01-29 LAB — HCG UR QL: NEGATIVE

## 2023-01-29 PROCEDURE — 72170 X-RAY EXAM OF PELVIS: CPT

## 2023-01-29 PROCEDURE — 72192 CT PELVIS W/O DYE: CPT

## 2023-01-29 PROCEDURE — 70450 CT HEAD/BRAIN W/O DYE: CPT

## 2023-01-29 PROCEDURE — 81025 URINE PREGNANCY TEST: CPT | Performed by: EMERGENCY MEDICINE

## 2023-01-29 PROCEDURE — 250N000011 HC RX IP 250 OP 636

## 2023-01-29 PROCEDURE — 96372 THER/PROPH/DIAG INJ SC/IM: CPT | Mod: 59

## 2023-01-29 PROCEDURE — 99285 EMERGENCY DEPT VISIT HI MDM: CPT | Mod: 25

## 2023-01-29 PROCEDURE — 250N000013 HC RX MED GY IP 250 OP 250 PS 637

## 2023-01-29 PROCEDURE — 72131 CT LUMBAR SPINE W/O DYE: CPT

## 2023-01-29 PROCEDURE — 27200 TREAT TAIL BONE FRACTURE: CPT

## 2023-01-29 RX ORDER — LIDOCAINE 4 G/G
1 PATCH TOPICAL ONCE
Status: DISCONTINUED | OUTPATIENT
Start: 2023-01-29 | End: 2023-01-29 | Stop reason: HOSPADM

## 2023-01-29 RX ORDER — LIDOCAINE 4 G/G
1 PATCH TOPICAL EVERY 24 HOURS
Qty: 15 PATCH | Refills: 0 | Status: SHIPPED | OUTPATIENT
Start: 2023-01-29

## 2023-01-29 RX ORDER — KETOROLAC TROMETHAMINE 15 MG/ML
15 INJECTION, SOLUTION INTRAMUSCULAR; INTRAVENOUS ONCE
Status: COMPLETED | OUTPATIENT
Start: 2023-01-29 | End: 2023-01-29

## 2023-01-29 RX ORDER — NAPROXEN 500 MG/1
500 TABLET ORAL 2 TIMES DAILY WITH MEALS
Qty: 24 TABLET | Refills: 0 | Status: SHIPPED | OUTPATIENT
Start: 2023-01-29 | End: 2023-02-06

## 2023-01-29 RX ORDER — CYCLOBENZAPRINE HCL 10 MG
10 TABLET ORAL ONCE
Status: COMPLETED | OUTPATIENT
Start: 2023-01-29 | End: 2023-01-29

## 2023-01-29 RX ORDER — CYCLOBENZAPRINE HCL 10 MG
10 TABLET ORAL 3 TIMES DAILY PRN
Qty: 10 TABLET | Refills: 0 | Status: SHIPPED | OUTPATIENT
Start: 2023-01-29 | End: 2023-02-04

## 2023-01-29 RX ADMIN — LIDOCAINE PATCH 4% 1 PATCH: 40 PATCH TOPICAL at 14:40

## 2023-01-29 RX ADMIN — CYCLOBENZAPRINE 10 MG: 10 TABLET, FILM COATED ORAL at 14:40

## 2023-01-29 RX ADMIN — KETOROLAC TROMETHAMINE 15 MG: 15 INJECTION, SOLUTION INTRAMUSCULAR; INTRAVENOUS at 14:40

## 2023-01-29 ASSESSMENT — ENCOUNTER SYMPTOMS
BACK PAIN: 1
NECK PAIN: 0
DIFFICULTY URINATING: 0
LIGHT-HEADEDNESS: 0
NAUSEA: 0
WEAKNESS: 0
FEVER: 0
WOUND: 0
DIZZINESS: 0
SHORTNESS OF BREATH: 0
HEMATURIA: 0
DYSURIA: 0
PHOTOPHOBIA: 0
CONFUSION: 0
ABDOMINAL PAIN: 0
FATIGUE: 0
CHILLS: 0
HEADACHES: 0
FREQUENCY: 0
VOMITING: 0

## 2023-01-29 ASSESSMENT — ACTIVITIES OF DAILY LIVING (ADL)
ADLS_ACUITY_SCORE: 33
ADLS_ACUITY_SCORE: 35

## 2023-01-29 NOTE — DISCHARGE INSTRUCTIONS
Please bring this paperwork with you to your follow-up appointment.    You were seen in the urgent care/emergency department for low back pain after a fall.     You had imaging done today, you had a CT scan done that showed a new small fracture of your coccyx or your tailbone.  The rest of your imaging looked good today.  This fracture does not require surgery, but will take several weeks to get better.  We will send you home with a pain medication called Flexeril, this will help relax the muscles and reduce the inflammation surrounding the pain.  Please do not drive while taking this medication as it causes drowsiness.    Please use lidocaine pain patches.  Use 1 patch to the area for 12 hours, remove for 12 hours and put a different patch in a different area for another 12 hours.  Repeat as needed.    For your symptoms:  Continue to ice and heat the area    Rest to help your back feel better.    Tylenol/ibuprofen as needed  You may take up to 650 mg of Tylenol (acetaminophen) up to 4 times daily and up to 600 mg of ibuprofen up to 4 times daily as needed for fever, pain.  Please do not take more than the daily maximum recommended dose (tylenol = 4 grams, ibuprofen = 2.4 grams) as it can cause harm to your liver, kidneys, stomach.  It is best to take ibuprofen with food. Please read labels of any over-the-counter medicine you may be taking as it may contain Tylenol (acetaminophen) or Advil (ibuprofen).     Follow up with your primary care provider for recheck in 3 days for ER follow-up    Return to the emergency department if you develop worsening pain, dizziness, headaches, fevers, vomiting, or any other new worsening or concerning symptoms. We'd be happy to see you again.    Thank you for allowing us to be part of your care today.    Take care!  -Tawny Lo PA-C

## 2023-01-29 NOTE — ED TRIAGE NOTES
Patient fell on the ice last Friday, landing on her bottom, deny's hitting her head.  Is here for lower back pain, hurts to sit.  Also hurts in her right calf but not as much.

## 2023-01-29 NOTE — ED NOTES
ER DISCHARGE NOTE:   Nataliia Chin MRN: [id number]      Nataliia Chin is discharged from the emergency room.    (M54.50) Low back pain  Comment: improving  Plan: medications and follow up    (W00.9XXA) Fall due to slipping on ice or snow, initial encounter  Comment:   Plan: medication    (S32.2XXA) Fracture of coccyx (H)  Comment:   Plan:         Nataliia Chin discharged via self with sister.    Verbalized understanding of discharge instructions.   Prescriptions: e-scribed.    AVS in hand.

## 2023-01-29 NOTE — ED PROVIDER NOTES
EMERGENCY DEPARTMENT ENCOUNTER      NAME: Nataliia Chin  AGE: 42 year old female  YOB: 1980  MRN: [id number]  EVALUATION DATE & TIME: 1/29/2023  1:01 PM    PCP: Jai Stiles    ED PROVIDER: Tawny Lo PA-C    Chief Complaint   Patient presents with     Fall     FINAL IMPRESSION:  1. Low back pain    2. Fall due to slipping on ice or snow, initial encounter    3. Fracture of coccyx (H)        MEDICAL DECISION MAKING:    Pertinent Labs & Imaging studies reviewed. (See chart for details)  Nataliia Chin is a 42 year old female who presents for evaluation of mechanical fall after slipping on the ice and low back pain.  Reports she slipped 2 nights ago trying to get in her car, she did hit her head on the fall, no loss of consciousness.  No blood thinners.  Has ongoing low back pain.    On my initial evaluation, patient slightly hypertensive, remainder of vital signs normal, afebrile, not tachycardic or hypoxic. On physical exam patient is awake, alert, no acute distress, standing in exam room.  She is not significantly uncomfortable, ill or toxic appearing.  Heart sounds are normal, lungs are clear without wheezing or crackles.  She has full range of motion of her neck.  No tenderness on palpation of her cervical thoracic or lumbar spine.  She does have mild coccygeal tenderness on palpation with bilateral buttock tenderness.  No ecchymosis, no skin abrasions or rashes..  No facial or skull deformity, swelling or abrasions.  Full neurologic exam without focal deficit.  Cranial nerves 3 through XII intact.  Normal heel-to-shin.  No pronator drift.  No ataxia.  Normal pulses, normal strength and sensation bilaterally.    Differential diagnosis includes muscular sprain, contusion, sacral fracture, lumbar fracture, dislocation, hematoma,, tendon or ligamentous injury, cauda equina, epidural hematoma. Emergency department workup included CT lumbar spine and x-ray of pelvis.  Had  shared decision-making conversation with patient and daughter regarding CT scan imaging as patient did hit her head.Patient and daughter report they would like a CT scan of her head.  Patient was given lidocaine pain patch, Flexeril and IM Toradol.    CT head negative for acute intracranial pathology. CT lumbar spine possible vertically oriented fracture involving the coccyx, recommended CT of the sacrum.  CT of pelvis was added, this was negative... XR pelvis without fracture..     Suspect the coccyx fracture is the cause for her pain, no surgical intervention required.  Spoke with patient regarding pain management.  Did provide expectant management about how pain will be present for several weeks.  Did give Ortho referral if she continues to have ongoing pain.  Based on exam today, I have low suspicion for any emergent process such as cauda equina, epidural hematoma that would be causing her symptoms.  She has been walking around the exam room without difficulty or weakness.  Her imaging is otherwise reassuring today.  Will send home with Flexeril and lidocaine pain patches.  Low suspicion for any emergent process that would require further ER intervention or hospital admission.  Provided expectant management as well as strict return precautions.    Patient has had serial examinations and notes significant improvement.     Patient was discharged in stable condition with treatment plan as below. Instructed to follow up with primary care provider in 3 days and return to the emergency department with any new or worsening of symptoms. Patient expressed understanding, feels comfortable, and is in agreement with this plan. All questions addressed prior to discharge.    Medical Decision Making    Supplemental history from: Documented in chart, if applicable and Family Member/Significant Other    External Record(s) Reviewed: Documented in chart, if applicable.    Differential Diagnosis: See MDM charting for differential  considered.     I performed an independent interpretation of the: N/A    Discussed with radiology regarding test interpretation: N/A    Discussion of management with another provider: Documented in chart, if applicable    The following testing was considered but ultimately not selected: None     I considered prescription management with: Symptomatic Management    The patient's care impacted: N/A    Consideration of Admission/Observation: N/A - Patient discharged without consideration for admission    Care significantly affected by Social Determinants of Health including: N/A    ED COURSE:  1:55 PM  I reviewed the patient's chart. I met with the patient to gather history and to perform my initial exam.    I wore appropriate PPE during this encounter including: facemask & eye protection   4:34 PM  I rechecked the patient and updated her on results. We discussed plan for discharge including treatment plan, follow-up and return precautions to emergency department.  Patient voiced understanding and in agreement with this plan.    At the conclusion of the encounter I discussed the results of all of the tests and the disposition. The questions were answered. The patient or family acknowledged understanding and was agreeable with the care plan.     MEDICATIONS GIVEN IN THE EMERGENCY:  Medications   Lidocaine (LIDOCARE) 4 % Patch 1 patch (1 patch Transdermal Patch/Med Applied 1/29/23 1440)     And   lidocaine patch in PLACE (has no administration in time range)   cyclobenzaprine (FLEXERIL) tablet 10 mg (10 mg Oral Given 1/29/23 1440)   ketorolac (TORADOL) injection 15 mg (15 mg Intramuscular Given 1/29/23 1440)       NEW PRESCRIPTIONS STARTED AT TODAY'S ER VISIT  Discharge Medication List as of 1/29/2023  4:45 PM      START taking these medications    Details   !! cyclobenzaprine (FLEXERIL) 10 MG tablet Take 1 tablet (10 mg) by mouth 3 times daily as needed for muscle spasms, Disp-10 tablet, R-0, E-Prescribe      Lidocaine  (LIDOCARE) 4 % Patch Place 1 patch onto the skin every 24 hours To prevent lidocaine toxicity, patient should be patch free for 12 hrs daily.Disp-15 patch, [id number]      naproxen (NAPROSYN) 500 MG tablet Take 1 tablet (500 mg) by mouth 2 times daily (with meals) for 8 days, Disp-24 tablet, R-0, E-Prescribe       !! - Potential duplicate medications found. Please discuss with provider.        =================================================================    HPI:    Patient information was obtained from: patient and daughter    Use of Interpretor: Yes (In Person) - Language Marshallese, daughter interpreting         Nataliia Chin is a 42 year old female with no pertinent history who presents to this ED for evaluation of low back pain.     Reports on Friday evening (2 days ago) she was walking in the parking lot near her car, when she slipped on the ice causing her to fall onto her low back.  She also reports she hit her head on the curb when she fell.  She was able to stand with the help of her daughter and sister.  She has been able to walk, but is painful.  Since then has had ongoing low back pain.  She has tried to manage the pain at home with Tylenol and heating pads, without much relief.  Reports that heat helps more than the Tylenol.  She has been having trouble sleeping due to the pain.  Present to the ER today due to ongoing pain and inability to sleep.  Denies loss of consciousness during the fall.  Is not taking any blood thinners.  Denies loss of bowel or bladder, numbness or tingling in arms or legs, neck pain, dizziness, headache, lightheadedness, vision changes, nausea, vomiting, fever, chills, abdominal pain, pain in arms or legs, urinary symptoms or other problems at this time.    REVIEW OF SYSTEMS:  Review of Systems   Constitutional: Negative for chills, fatigue and fever.   Eyes: Negative for photophobia and visual disturbance.   Respiratory: Negative for shortness of breath.     Cardiovascular: Negative for chest pain.   Gastrointestinal: Negative for abdominal pain, nausea and vomiting.   Genitourinary: Negative for difficulty urinating, dysuria, frequency and hematuria.   Musculoskeletal: Positive for back pain. Negative for neck pain.   Skin: Negative for wound.   Neurological: Negative for dizziness, weakness, light-headedness and headaches.   Psychiatric/Behavioral: Negative for confusion.   All other systems reviewed and are negative.     PAST MEDICAL HISTORY:  No past medical history on file.    PAST SURGICAL HISTORY:  Past Surgical History:   Procedure Laterality Date     LAPAROSCOPIC TUBAL LIGATION         CURRENT MEDICATIONS:      Current Facility-Administered Medications:      Lidocaine (LIDOCARE) 4 % Patch 1 patch, 1 patch, Transdermal, Once, 1 patch at 01/29/23 1440 **AND** lidocaine patch in PLACE, , Transdermal, Q8H Wally HOUSTON Elizabeth, PA-C    Current Outpatient Medications:      cyclobenzaprine (FLEXERIL) 10 MG tablet, Take 1 tablet (10 mg) by mouth 3 times daily as needed for muscle spasms, Disp: 10 tablet, Rfl: 0     Lidocaine (LIDOCARE) 4 % Patch, Place 1 patch onto the skin every 24 hours To prevent lidocaine toxicity, patient should be patch free for 12 hrs daily., Disp: 15 patch, Rfl: 0     naproxen (NAPROSYN) 500 MG tablet, Take 1 tablet (500 mg) by mouth 2 times daily (with meals) for 8 days, Disp: 24 tablet, Rfl: 0     acetaminophen (TYLENOL) 500 MG tablet, Take 2 tablets (1,000 mg) by mouth every 8 hours as needed for mild pain, Disp: 30 tablet, Rfl: 0     aspirin (ASA) 81 MG EC tablet, Take 1 tablet (81 mg) by mouth daily, Disp: 30 tablet, Rfl: 0     atorvastatin (LIPITOR) 80 MG tablet, Take 1 tablet (80 mg) by mouth daily, Disp: 90 tablet, Rfl: 3     cyclobenzaprine (FLEXERIL) 10 MG tablet, Take 1 tablet (10 mg) by mouth nightly as needed for muscle spasms, Disp: 30 tablet, Rfl: 0     metFORMIN (GLUCOPHAGE XR) 500 MG 24 hr tablet, Take 2 tablets (1,000 mg)  "by mouth 2 times daily (with meals) Week 1: Take 1 tablet with breakfast for a week. Week 2: Take 1 tablet with breakfast and 1 tablet with dinner for a week Week 3: Take 2 tablets with breakfast and 1 tablet with dinner for a week Week 4: Take 2 tablets with breakfast and 2 tablets with dinner for a week. Continue at this dose., Disp: 90 tablet, Rfl: 3    ALLERGIES:  Allergies   Allergen Reactions     Penicillins Anaphylaxis and Itching     At age 15 years old--injection of penicillin     Amoxicillin Itching       FAMILY HISTORY:  No family history on file.    SOCIAL HISTORY:   Social History     Socioeconomic History     Marital status:    Tobacco Use     Smoking status: Never     Smokeless tobacco: Never   Social History Narrative    ** Merged History Encounter **            VITALS:  Patient Vitals for the past 24 hrs:   BP Temp Temp src Pulse Resp SpO2 Height Weight   01/29/23 1614 133/82 98.2  F (36.8  C) Oral 64 16 100 % -- --   01/29/23 1251 (!) 162/78 97.5  F (36.4  C) Oral 68 16 96 % 1.651 m (5' 5\") 87.5 kg (193 lb)       PHYSICAL EXAM    Constitutional: Well developed, Well nourished, NAD, standing walking around in exam room.  She is not significantly uncomfortable, ill or toxic appearing.   HENT: Normocephalic, Atraumatic, Bilateral external ears normal, Oropharynx normal, mucous membranes moist, Nose normal.   Neck: Normal range of motion, No tenderness, Supple, No stridor.  Eyes: PERRL, EOMI, Conjunctiva normal, No discharge.   Respiratory: Normal breath sounds, No respiratory distress, No wheezing, Speaks full sentences easily. No cough.  Cardiovascular: Normal heart rate, Regular rhythm, No murmurs, No rubs, No gallops. Chest wall nontender.  GI: Soft, No tenderness, No masses, No flank tenderness. No rebound or guarding.  Musculoskeletal:  No tenderness on palpation of her cervical thoracic or lumbar spine.  She does have mild coccygeal tenderness on palpation with bilateral buttock " tenderness. 2+ DP pulses. No edema. No cyanosis, No clubbing. Good range of motion in all major joints. No tenderness to palpation or major deformities noted. No tenderness of the CTLS spine.   Integument: Warm, Dry, No erythema, No ecchymosis, no skin abrasions or rashes..    Neurologic: Full neurologic exam without focal deficit.  Cranial nerves 3 through XII intact.  Normal heel-to-shin.  No pronator drift.  No ataxia.  Alert & oriented x 3, Normal motor function, Normal sensory function, No focal deficits noted. Normal gait.  Psychiatric: Affect normal, Judgment normal, Mood normal. Cooperative.    LAB:  All pertinent labs reviewed and interpreted.  Labs Ordered and Resulted from Time of ED Arrival to Time of ED Departure   HCG QUALITATIVE URINE - Normal       Result Value    hCG Urine Qualitative Negative         RADIOLOGY:  Reviewed all pertinent imaging. Please see official radiology report.  CT Pelvis Bone wo Contrast   Final Result   IMPRESSION:   1.  Unremarkable CT of the pelvis.   2.  No fractures are evident.         XR Pelvis 1/2 Views   Final Result   IMPRESSION: No definite fracture is identified. Normal joint spaces and alignment.      Lumbar spine CT w/o contrast   Final Result   IMPRESSION:   HEAD CT:   1.  No acute intracranial process.      LUMBAR SPINE CT:   1.  Suggestion of a possible vertically-oriented fracture involving the coccyx seen on the coronal reformatted images (image 79, series 7). Recommend further evaluation with dedicated CT of the sacrum, if clinically warranted.      2.  No acute fracture of the lumbar spine.      Head CT w/o contrast   Final Result   IMPRESSION:   HEAD CT:   1.  No acute intracranial process.      LUMBAR SPINE CT:   1.  Suggestion of a possible vertically-oriented fracture involving the coccyx seen on the coronal reformatted images (image 79, series 7). Recommend further evaluation with dedicated CT of the sacrum, if clinically warranted.      2.  No acute  fracture of the lumbar spine.          EKG:    None     PROCEDURES:   None     Diagnosis:  1. Low back pain    2. Fall due to slipping on ice or snow, initial encounter    3. Fracture of coccyx (H)        Tawny Lo PA-C  Emergency Medicine  [facility]  1/29/2023       Tawny Lo PA-C  01/29/23 1743

## 2023-01-29 NOTE — Clinical Note
Nataliia Chin was seen and treated in our emergency department on 1/29/2023.  She may return to work on 02/06/2023.  ?     If you have any questions or concerns, please don't hesitate to call.      Tawny Lo PA-C

## 2024-03-29 ENCOUNTER — APPOINTMENT (OUTPATIENT)
Dept: INTERPRETER SERVICES | Facility: CLINIC | Age: 44
End: 2024-03-29
Payer: COMMERCIAL

## 2025-01-31 ENCOUNTER — APPOINTMENT (OUTPATIENT)
Dept: CT IMAGING | Facility: HOSPITAL | Age: 45
End: 2025-01-31
Attending: EMERGENCY MEDICINE
Payer: COMMERCIAL

## 2025-01-31 ENCOUNTER — HOSPITAL ENCOUNTER (EMERGENCY)
Facility: HOSPITAL | Age: 45
Discharge: HOME | End: 2025-01-31
Attending: EMERGENCY MEDICINE | Admitting: EMERGENCY MEDICINE
Payer: COMMERCIAL

## 2025-01-31 VITALS
DIASTOLIC BLOOD PRESSURE: 91 MMHG | HEIGHT: 66 IN | WEIGHT: 195 LBS | SYSTOLIC BLOOD PRESSURE: 138 MMHG | OXYGEN SATURATION: 100 % | BODY MASS INDEX: 31.34 KG/M2 | RESPIRATION RATE: 16 BRPM | TEMPERATURE: 98.3 F | HEART RATE: 66 BPM

## 2025-01-31 DIAGNOSIS — L03.211 FACIAL CELLULITIS: ICD-10-CM

## 2025-01-31 PROCEDURE — 70486 CT MAXILLOFACIAL W/O DYE: CPT

## 2025-01-31 PROCEDURE — 70450 CT HEAD/BRAIN W/O DYE: CPT

## 2025-01-31 PROCEDURE — 72125 CT NECK SPINE W/O DYE: CPT

## 2025-01-31 PROCEDURE — 250N000013 HC RX MED GY IP 250 OP 250 PS 637: Performed by: EMERGENCY MEDICINE

## 2025-01-31 PROCEDURE — 99284 EMERGENCY DEPT VISIT MOD MDM: CPT | Mod: 25

## 2025-01-31 RX ORDER — DOXYCYCLINE 100 MG/1
100 CAPSULE ORAL 2 TIMES DAILY
Qty: 14 CAPSULE | Refills: 0 | Status: SHIPPED | OUTPATIENT
Start: 2025-01-31 | End: 2025-02-07

## 2025-01-31 RX ORDER — DOXYCYCLINE 100 MG/1
100 CAPSULE ORAL ONCE
Status: COMPLETED | OUTPATIENT
Start: 2025-01-31 | End: 2025-01-31

## 2025-01-31 RX ADMIN — DOXYCYCLINE 100 MG: 100 CAPSULE ORAL at 22:37

## 2025-01-31 ASSESSMENT — ACTIVITIES OF DAILY LIVING (ADL)
ADLS_ACUITY_SCORE: 41

## 2025-01-31 ASSESSMENT — COLUMBIA-SUICIDE SEVERITY RATING SCALE - C-SSRS
2. HAVE YOU ACTUALLY HAD ANY THOUGHTS OF KILLING YOURSELF IN THE PAST MONTH?: NO
1. IN THE PAST MONTH, HAVE YOU WISHED YOU WERE DEAD OR WISHED YOU COULD GO TO SLEEP AND NOT WAKE UP?: NO
6. HAVE YOU EVER DONE ANYTHING, STARTED TO DO ANYTHING, OR PREPARED TO DO ANYTHING TO END YOUR LIFE?: NO

## 2025-01-31 NOTE — ED TRIAGE NOTES
Patient presents to ED for left sided facial pain following a fall on December 25th.  Patient states she was seen by a provider after fall for stitches in area, but no imaging was done at that time.  States pain in left facial area has been increasing recently.      Triage Assessment (Adult)       Row Name 01/31/25 2826          Triage Assessment    Airway WDL WDL        Respiratory WDL    Respiratory WDL WDL        Skin Circulation/Temperature WDL    Skin Circulation/Temperature WDL WDL        Cardiac WDL    Cardiac WDL WDL        Peripheral/Neurovascular WDL    Peripheral Neurovascular WDL WDL        Cognitive/Neuro/Behavioral WDL    Cognitive/Neuro/Behavioral WDL WDL

## 2025-02-01 NOTE — ED PROVIDER NOTES
"EMERGENCY DEPARTMENT NOTE     Name: Nataliia Chin    Age/Sex: 44 year old female   MRN: [id number]   Evaluation Date & Time:  1/31/2025  6:01 PM    PCP:    Jai Hansen   ED Provider: Jerod Sanz D.O.       CHIEF COMPLAINT    Facial Injury     HISTORY OF PRESENT ILLNESS   Nataliia Chin is a 44 year old year old female with a relevant past history of left distal radius ORIF surgery, who presents to the ED  for evaluation of facial injury.    The patient reports she had a fall on 12/25/24 (~ 1 month ago) onto her left wrist and left side of his face. She reports having 15 stitches in her face and wrist surgery at that time. The patient endorses having more pain in the left side of her face, forehead, and neck recently. She notes feeling chills and dizziness. She states she was seen at [facility] in [place], [place] initially for her fall.      DIAGNOSIS & DISPOSITION/MEDICAL DECISION MAKING     1. Facial cellulitis        EMERGENCY DEPARTMENT COURSE   6:35 PM I met with the patient to gather history and to perform my initial exam.  We discussed treatment options and the plan for care while in the Emergency Department.  10:40 PM We discussed the plan for discharge and the patient is agreeable. Reviewed supportive cares, symptomatic treatment, outpatient follow up, and reasons to return to the Emergency Department. Patient to be discharged by ED RN.   Triage vital signs:BP (!) 138/91   Pulse 66   Temp 98.3  F (36.8  C) (Oral)   Resp 16   Ht 1.676 m (5' 6\")   Wt 88.5 kg (195 lb)   LMP 01/31/2025 (Exact Date)   SpO2 100%   BMI 31.47 kg/m     Differential diagnosis considered included but not limited to: Process including orbital fracture, cervical spine fracture, subdural hematoma.  Infectious process including facial cellulitis    MDM: Exam had erythema over previously sutured laceration with slight swelling of the left zygomatic arch.  No periorbital involvement.  Dorsal spinous process " "tenderness cervical spine and mid to lower levels.  Diagnostic studies:  CT of the head, CT cervical spine, CT facial bones without traumatic findings.  Findings discussed with the patient and she was reassured.  Will place on doxycycline for probable early facial cellulitis.  Patient will be discharged, doxycycline for 7 days.  Apply warm compresses several times daily.  Follow-up with primary care clinic next week for recheck.  Return criteria discussed if she has increased facial swelling or redness particularly swelling around the eye, or develops fever will return to the emergency department.     Discharge Vital Signs:BP (!) 138/91   Pulse 66   Temp 98.3  F (36.8  C) (Oral)   Resp 16   Ht 1.676 m (5' 6\")   Wt 88.5 kg (195 lb)   LMP 01/31/2025 (Exact Date)   SpO2 100%   BMI 31.47 kg/m     PROCEDURES:   None  Diagnostic studies:  CT Facial Bones without Contrast   Final Result   IMPRESSION:   HEAD CT:   1.  Normal head CT.      FACIAL BONE CT:   1.  No facial bone or mandibular fracture.      CERVICAL SPINE CT:   1.  No fracture or posttraumatic subluxation.   2.  No high-grade spinal canal or neural foraminal stenosis.      CT Cervical Spine w/o Contrast   Final Result   IMPRESSION:   HEAD CT:   1.  Normal head CT.      FACIAL BONE CT:   1.  No facial bone or mandibular fracture.      CERVICAL SPINE CT:   1.  No fracture or posttraumatic subluxation.   2.  No high-grade spinal canal or neural foraminal stenosis.      CT Head w/o Contrast   Final Result   IMPRESSION:   HEAD CT:   1.  Normal head CT.      FACIAL BONE CT:   1.  No facial bone or mandibular fracture.      CERVICAL SPINE CT:   1.  No fracture or posttraumatic subluxation.   2.  No high-grade spinal canal or neural foraminal stenosis.        Labs Ordered and Resulted from Time of ED Arrival to Time of ED Departure - No data to display  ED INTERVENTIONS     Medications   doxycycline monohydrate (MONODOX) capsule 100 mg (has no administration in " time range)     TOTAL CRITICAL CARE TIME (EXCLUDING PROCEDURES): Not applicable      DISCHARGE MEDICATIONS        Review of your medicines        UNREVIEWED medicines. Ask your doctor about these medicines        Dose / Directions   acetaminophen 500 MG tablet  Commonly known as: TYLENOL  Used for: Neck pain      Dose: 1,000 mg  Take 2 tablets (1,000 mg) by mouth every 8 hours as needed for mild pain  Quantity: 30 tablet  Refills: 0     aspirin 81 MG EC tablet  Commonly known as: ASA  Used for: Drooping of mouth      Dose: 81 mg  Take 1 tablet (81 mg) by mouth daily  Quantity: 30 tablet  Refills: 0     atorvastatin 80 MG tablet  Commonly known as: LIPITOR  Used for: Drooping of mouth      Dose: 80 mg  Take 1 tablet (80 mg) by mouth daily  Quantity: 90 tablet  Refills: 3     cyclobenzaprine 10 MG tablet  Commonly known as: Flexeril  Used for: Cervical radiculopathy      Dose: 10 mg  Take 1 tablet (10 mg) by mouth nightly as needed for muscle spasms  Quantity: 30 tablet  Refills: 0     Lidocaine 4 % Patch  Commonly known as: LIDOCARE      Dose: 1 patch  Place 1 patch onto the skin every 24 hours To prevent lidocaine toxicity, patient should be patch free for 12 hrs daily.  Quantity: 15 patch  Refills: 0     metFORMIN 500 MG 24 hr tablet  Commonly known as: GLUCOPHAGE XR  Used for: Prediabetes      Dose: 1,000 mg  Take 2 tablets (1,000 mg) by mouth 2 times daily (with meals) Week 1: Take 1 tablet with breakfast for a week. Week 2: Take 1 tablet with breakfast and 1 tablet with dinner for a week Week 3: Take 2 tablets with breakfast and 1 tablet with dinner for a week Week 4: Take 2 tablets with breakfast and 2 tablets with dinner for a week. Continue at this dose.  Quantity: 90 tablet  Refills: 3            START taking        Dose / Directions   doxycycline hyclate 100 MG capsule  Commonly known as: VIBRAMYCIN      Dose: 100 mg  Take 1 capsule (100 mg) by mouth 2 times daily for 7 days.  Quantity: 14 capsule  Refills:  0               Where to get your medicines        Some of these will need a paper prescription and others can be bought over the counter. Ask your nurse if you have questions.    Bring a paper prescription for each of these medications  doxycycline hyclate 100 MG capsule       DISPOSITION: Home    Medical Decision Making    At the time of my evaluation, I do not feel the patient s symptoms are caused by sepsis      I obtained additional history from these independent historians:  The patient  I reviewed these outside records:  1/20/25, the patient was seen at [facility] for a surgical follow up of a left distal radius ORIF.  I noted these abnormal vital signs / labs:  /91 normalizes somewhat to 138/91 asymptomatic and recommended recheck at primary care follow-up    Monitor Strip Interpretation:  NA  12-Lead ECG Interpretation:  NA  I independently reviewed the following diagnostic studies:  CT Head  I spoke to the following clinicians regard the patients care:  NA  My disposition decision is based on the following reasons:    Discharge: I considered admission but discharged the patient after current workup and patient's clinical course in the emergency department.  Prescription medications prescribed included doxycycline      MIPS Documentation Not Applicable    At the conclusion of the encounter I discussed the results of all of the tests and the disposition. The questions were answered. The patient or family acknowledged understanding and was agreeable with the care plan.            INFORMATION SOURCE AND LIMITATIONS    History/Exam limitations: Language   Patient information was obtained from: The patient  Use of : Yes (Phone) - Serbian    REVIEW OF SYSTEMS:   All other systems reviewed and are negative except as noted above in HPI.    PATIENT HISTORY   No past medical history on file.  Patient Active Problem List   Diagnosis    Numbness and tingling of right arm     "Cervical herniated disc    Right arm weakness     Past Surgical History:   Procedure Laterality Date    LAPAROSCOPIC TUBAL LIGATION         Allergies   Allergen Reactions    Penicillins Anaphylaxis and Itching     At age 15 years old--injection of penicillin    Amoxicillin Itching       OUTPATIENT MEDICATIONS     New Prescriptions    DOXYCYCLINE HYCLATE (VIBRAMYCIN) 100 MG CAPSULE    Take 1 capsule (100 mg) by mouth 2 times daily for 7 days.      Vitals:    01/31/25 1651 01/31/25 2227   BP: (!) 144/91 (!) 138/91   Pulse: 75 66   Resp: 16 16   Temp: 98.3  F (36.8  C)    TempSrc: Oral    SpO2: 99% 100%   Weight: 88.5 kg (195 lb)    Height: 1.676 m (5' 6\")        Physical Exam   Constitutional: Oriented to person, place, and time. Appears well-developed and well-nourished.   HEENT:   Patient has scar overlying her left infraorbital and left zygomatic arch appears to be well-healed.  There is slight swelling and erythema.  No periorbital swelling.  Patient had dorsal spinous process tenderness of the mid to lower cervical spine.  Head was atraumatic.  Cardiovascular: Normal rate, regular rhythm and normal heart sounds.    Pulmonary/Chest: Normal effort  and breath sounds normal.   Skin: Erythema over the right zygomatic arch without periorbital involvement      DIAGNOSTICS    LABORATORY FINDINGS (REVIEWED AND INTERPRETED):  Labs Ordered and Resulted from Time of ED Arrival to Time of ED Departure - No data to display      IMAGING (REVIEWED AND INTERPRETED):  CT Facial Bones without Contrast   Final Result   IMPRESSION:   HEAD CT:   1.  Normal head CT.      FACIAL BONE CT:   1.  No facial bone or mandibular fracture.      CERVICAL SPINE CT:   1.  No fracture or posttraumatic subluxation.   2.  No high-grade spinal canal or neural foraminal stenosis.      CT Cervical Spine w/o Contrast   Final Result   IMPRESSION:   HEAD CT:   1.  Normal head CT.      FACIAL BONE CT:   1.  No facial bone or mandibular fracture.    "   CERVICAL SPINE CT:   1.  No fracture or posttraumatic subluxation.   2.  No high-grade spinal canal or neural foraminal stenosis.      CT Head w/o Contrast   Final Result   IMPRESSION:   HEAD CT:   1.  Normal head CT.      FACIAL BONE CT:   1.  No facial bone or mandibular fracture.      CERVICAL SPINE CT:   1.  No fracture or posttraumatic subluxation.   2.  No high-grade spinal canal or neural foraminal stenosis.            ECG (REVIEWED AND INTERPRETED):   ECG:   None performed at this visit.        Jerod Sanz D.O.  EMERGENCY MEDICINE   01/31/25  [facility] EMERGENCY DEPARTMENT  [address] [place] [id number]  [phone]  Dept: [phone]     Jerod Sanz DO  02/02/25 0003

## 2025-02-01 NOTE — DISCHARGE INSTRUCTIONS
Apply warm compresses 15 minutes every 2 hours over the next 2 days to the facial region.  Continue doxycycline twice daily for 7 days.  See your primary care physician in follow-up early next week.  If you have increased redness or swelling in your facial region particularly if you have any swelling around your eye despite the use of the antibiotics return to the emergency department.